# Patient Record
Sex: FEMALE | Employment: OTHER | URBAN - METROPOLITAN AREA
[De-identification: names, ages, dates, MRNs, and addresses within clinical notes are randomized per-mention and may not be internally consistent; named-entity substitution may affect disease eponyms.]

---

## 2020-04-29 ENCOUNTER — HOSPITAL ENCOUNTER (OUTPATIENT)
Dept: PHYSICAL THERAPY | Age: 80
Discharge: HOME | End: 2020-04-29
Payer: MEDICARE

## 2020-04-29 PROCEDURE — 97140 MANUAL THERAPY 1/> REGIONS: CPT

## 2020-04-29 PROCEDURE — 97530 THERAPEUTIC ACTIVITIES: CPT

## 2020-04-29 PROCEDURE — 97162 PT EVAL MOD COMPLEX 30 MIN: CPT

## 2020-04-29 NOTE — PROGRESS NOTES
PT DAILY TREATMENT NOTE 10-18    Patient Name: Jin Shaw  Date:2020  : 1940  [x]  Patient  Verified  Payor: [place] MEDICARE / Plan: [place] MEDICARE PART A & B / Product Type: Medicare /    In time:2:05  Out time:2:50  Total Treatment Time (min): 45  Visit #: 1 of 10      Medicare/BCBS Only   Total Timed Codes (min):  25 1:1 Treatment Time:  40       Treatment Area: Right shoulder pain [M25.511]     SUBJECTIVE  Pain Level (0-10 scale): 7/10  Any medication changes, allergies to medications, adverse drug reactions, diagnosis change, or new procedure performed?: [x] No    [] Yes (see summary sheet for update)  Subjective functional status/changes:   [x] See paper initial evaluation form in patient chart.       OBJECTIVE    20 min [x]Eval                  []Re-Eval     10 min Therapeutic Activity:  [] See flow sheet : prognosis; surgical rehab protocol, HEP given and reviewed with Pt   Rationale: increase ROM and increase strength to improve the patients ability to improve right shoulder mobility with proper GHJ mechanics and increase ability to perform functional tasks independently    15 min Manual Therapy:  S/L gentle scapular mobs, STM to right rhomboids; Supine gentle STM to right deltoid, bicep; Gr I oscillations, manual stretching into Flex, Scaption, ER at neutral   Rationale: decrease pain, increase ROM, increase tissue extensibility and decrease trigger points to improve right shoulder mobility with less pain to increase ease of reaching    With   [] TE   [x] TA   [] neuro   [] other: Patient Education: [x] Review HEP    [] Progressed/Changed HEP based on:   [] positioning   [] body mechanics   [] transfers   [] heat/ice application    [] other:      Other Objective/Functional Measures: FOTO 45 pts     Pain Level (0-10 scale) post treatment: 7/10    ASSESSMENT/Changes in Function:     Patient will continue to benefit from skilled PT services to address functional mobility deficits, address ROM deficits, address strength deficits, analyze and address soft tissue restrictions, analyze and cue movement patterns, analyze and modify body mechanics/ergonomics, assess and modify postural abnormalities and instruct in home and community integration to attain remaining goals.      [x]  See Plan of Care  []  See progress note/recertification  []  See Discharge Summary         PLAN  []  Upgrade activities as tolerated     [x]  Continue plan of care  []  Update interventions per flow sheet       []  Discharge due to:_  []  Other:_      Bessie Anderson, PT 4/29/2020  5:36 PM

## 2020-04-29 NOTE — PROGRESS NOTES
[facility], [address] [place] [address]  [phone] fax  Plan of Care/ Statement of Necessity for Physical Therapy Services    Patient name: Kathe Mendoza Start of Care: 2020   Referral source: Conner Riggins MD : 1940    Medical Diagnosis: Right shoulder pain [M25.511]  Payor: VA MEDICARE / Plan: VA MEDICARE PART A & B / Product Type: Medicare /  Onset Date:3/12/20 (surgery)    Treatment Diagnosis: Right Shoulder Pain   Prior Hospitalization: see medical history Provider#: [id number]   Medications: Verified on Patient summary List    Comorbidities: Arthritis; DM; Osteoporosis; Thyroid problems; hx left shoulder surgeries x 2; hx right shoulder surgeries x 3   Prior Level of Function: Lives in [place] with son's family; amb without A.D; modified independent with ADLs      The Plan of Care and following information is based on the information from the initial evaluation. Assessment/ key information: Pt is a 79 y.o. female who presents with c/o right shoulder pain, limited mobility, and strength, affecting ability to raise arm overhead, lift or carry items, perform bathing and grooming independently, and assist with moderate household chores. Pt with long term hx of right shoulder pain secondary to arthritis that did not improve with conservative management and previous surgeries, resulting in need for right reverse TSR on 3/12/20. Pt recently moved to [address] [place] [address] after death of spouse and only had two weeks of PT but continues compliance with PROM based HEP. Upon exam, Pt exhibited right shoulder AROM of Flex 71 deg, ABD 60 deg, Ext 32 deg, ER at neutral - 5 deg; 2/5 strength due to limited ROM, and palpable tightness in right arm and along medial scapular border. Pt would benefit from skilled PT to address above deficits to improve Pt's functional independence with less pain.     Evaluation Complexity History MEDIUM  Complexity : 1-2 comorbidities / personal factors will impact the outcome/ POC ; Examination MEDIUM Complexity : 3 Standardized tests and measures addressing body structure, function, activity limitation and / or participation in recreation  ;Presentation MEDIUM Complexity : Evolving with changing characteristics  ; Clinical Decision Making MEDIUM Complexity : FOTO score of 26-74  Overall Complexity Rating: MEDIUM  Problem List: pain affecting function, decrease ROM, decrease strength, edema affecting function, decrease ADL/ functional abilitiies, decrease activity tolerance, decrease flexibility/ joint mobility and decrease transfer abilities   Treatment Plan may include any combination of the following: Therapeutic exercise, Therapeutic activities, Neuromuscular re-education, Physical agent/modality, Manual therapy, Patient education, Self Care training and Functional mobility training  Patient / Family readiness to learn indicated by: asking questions, trying to perform skills and interest  Persons(s) to be included in education: patient (P)  Barriers to Learning/Limitations: None  Patient Goal (s): Movement, less pain, recovery.   Patient Self Reported Health Status: good  Rehabilitation Potential: good    Short Term Goals: To be accomplished in 1 weeks:  Goal: Pt to be compliant with initial HEP to improve right shoulder mobility for ease of positioning and ADL performance. Status at last note/certification: Established and reviewed with Pt  Long Term Goals: To be accomplished in 5 weeks:  Goal: Pt to increase right shoulder AROM by at least 20 deg all planes to increase ease of bathing and dressing independently. Status at last note/certification: Flex 71 deg, ABD 60 deg, Ext 32 deg, ER at neutral - 5 deg  Goal: Pt to increase right shoulder strength to 3+/5 to increase ease of lifting light grocery bags.   Status at last note/certification: 2/5 grossly due to limited ROM  Goal: Pt to report < 5/10 pain at worst to be able to sleep comfortably at night in bed for better rest.  Status at last note/certification: 37/32 pain at worst; sleeps on couch or bed throughout each night  Goal: Pt to report FOTO score of 58 pts to show improved function and quality of life. Status at last note/certification: FOTO 45 pts     Frequency / Duration: Patient to be seen 2 times per week for 5 weeks. Patient/ Caregiver education and instruction: Diagnosis, prognosis, exercises   [x]  Plan of care has been reviewed with PTA    Certification Period: 4/29/20 - 5/28/20  Abimbola Anderson, PT 4/29/2020 5:40 PM  _____________________________________________________________________  I certify that the above Therapy Services are being furnished while the patient is under my care. I agree with the treatment plan and certify that this therapy is necessary.     Physician's Signature:____________Date:_________TIME:________    ** Signature, Date and Time must be completed for valid certification **    Please sign and return to [facility] [address]  [phone] fax

## 2020-05-04 ENCOUNTER — HOSPITAL ENCOUNTER (OUTPATIENT)
Dept: PHYSICAL THERAPY | Age: 80
Discharge: HOME | End: 2020-05-04
Payer: MEDICARE

## 2020-05-04 PROCEDURE — 97110 THERAPEUTIC EXERCISES: CPT

## 2020-05-04 PROCEDURE — 97140 MANUAL THERAPY 1/> REGIONS: CPT

## 2020-05-04 NOTE — PROGRESS NOTES
PT DAILY TREATMENT NOTE 10-18    Patient Name: Daniel Froeman  Date:2020  : 1940  [x]  Patient  Verified  Payor: Brian Risk / Plan: VA MEDICARE PART A & B / Product Type: Medicare /    In time:11:20  Out time:12:10  Total Treatment Time (min): 50  Visit #: 2 of 10    Medicare/BCBS Only   Total Timed Codes (min):  40 1:1 Treatment Time:  40       Treatment Area: Right shoulder pain [M25.511]    SUBJECTIVE  Pain Level (0-10 scale): 8/10  Any medication changes, allergies to medications, adverse drug reactions, diagnosis change, or new procedure performed?: [x] No    [] Yes (see summary sheet for update)  Subjective functional status/changes:   [] No changes reported  \"The arm is sore. I think it might be the weather. I just feel an ache from the shoulder all the way down to the elbow. \"    OBJECTIVE    Modality rationale: decrease inflammation and decrease pain to improve the patients ability to reduce soreness after therapy   Min Type Additional Details    [] Estim:  []Unatt       []IFC  []Premod                        []Other:  []w/ice   []w/heat  Position:  Location:    [] Estim: []Att    []TENS instruct  []NMES                    []Other:  []w/US   []w/ice   []w/heat  Position:  Location:    []  Traction: [] Cervical       []Lumbar                       [] Prone          []Supine                       []Intermittent   []Continuous Lbs:  [] before manual  [] after manual    []  Ultrasound: []Continuous   [] Pulsed                           []1MHz   []3MHz W/cm2:  Location:    []  Iontophoresis with dexamethasone         Location: [] Take home patch   [] In clinic   10 [x]  Ice     []  heat  []  Ice massage  []  Laser   []  Anodyne Position: seated  Location: right shoulder    []  Laser with stim  []  Other:  Position:  Location:    []  Vasopneumatic Device Pressure:       [] lo [] med [] hi   Temperature: [] lo [] med [] hi   [x] Skin assessment post-treatment:  [x]intact []redness- no adverse reaction    []redness  adverse reaction:     23 min Therapeutic Exercise:  [] See flow sheet :   Rationale: increase ROM and increase strength to improve the patients ability to improve right shoulder mobility, strength for ease with bathing, dressing    17 min Manual Therapy:  S/L gentle scapular mobs, STM to rhomboids; Supine STM to right deltoid, biceps, gentle manual stretching Flex/Scaption; ant/post GHJ glides at neutral, Gr II   Rationale: decrease pain, increase ROM, increase tissue extensibility and decrease trigger points to improve right shoulder, scapular mobility for ease of reaching          With   [] TE   [] TA   [] neuro   [] other: Patient Education: [x] Review HEP    [] Progressed/Changed HEP based on:   [] positioning   [] body mechanics   [] transfers   [] heat/ice application    [] other:      Other Objective/Functional Measures: Initiated Rx program per flow sheet. Pt given verbal and demonstrative cueing for proper technique of all exercises. Emphasized staying within tolerable ranges to avoid increases in overall pain levels. Pain Level (0-10 scale) post treatment: 0/10    ASSESSMENT/Changes in Function: Pt reported reduction in right scapular and shoulder pain with manual interventions at beginning of session but still has \"catches\" in right arm when moving. Pt most limited in ER, not tolerating at all, secondary to pain. Focused on gentle ROM within Pt's tolerance as Pt has not had PT in over 3 weeks and exhibits increased soft tissue restrictions and pain. Patient will continue to benefit from skilled PT services to address functional mobility deficits, address ROM deficits, address strength deficits, analyze and address soft tissue restrictions, analyze and cue movement patterns, analyze and modify body mechanics/ergonomics, assess and modify postural abnormalities and instruct in home and community integration to attain remaining goals.      []  See Plan of Care  []  See progress note/recertification  []  See Discharge Summary         Progress towards goals / Updated goals:  Short Term Goals: To be accomplished in 1 weeks:  Goal: Pt to be compliant with initial HEP to improve right shoulder mobility for ease of positioning and ADL performance. Status at last note/certification: Established and reviewed with Pt  Current status: met - Pt reports compliance  Long Term Goals: To be accomplished in 5 weeks:  Goal: Pt to increase right shoulder AROM by at least 20 deg all planes to increase ease of bathing and dressing independently. Status at last note/certification: Flex 71 deg, ABD 60 deg, Ext 32 deg, ER at neutral - 5 deg  Goal: Pt to increase right shoulder strength to 3+/5 to increase ease of lifting light grocery bags. Status at last note/certification: 2/5 grossly due to limited ROM  Goal: Pt to report < 5/10 pain at worst to be able to sleep comfortably at night in bed for better rest.  Status at last note/certification: 88/16 pain at worst; sleeps on couch or bed throughout each night  Goal: Pt to report FOTO score of 58 pts to show improved function and quality of life.   Status at last note/certification: FOTO 45 pts     PLAN  []  Upgrade activities as tolerated     [x]  Continue plan of care  []  Update interventions per flow sheet       []  Discharge due to:_  []  Other:_      Cathi Anderson, PT 5/4/2020  1:08 PM    Future Appointments   Date Time Provider Des Hernandez   5/7/2020 12:00 PM Gina Ceballos, PT [facility]   5/11/2020 11:15 AM Cathi Anderson, PT [facility]   5/13/2020 11:15 AM Cathi Anderson, PT [facility]   5/18/2020 11:15 AM Cathi Anderson, PT [facility]   5/20/2020 11:15 AM Cathi Anderson, PT [facility]   5/26/2020 12:00 PM Cathi Anderson, PT [facility]   5/28/2020 11:15 AM Cathi Anderson, PT [facility]

## 2020-05-07 ENCOUNTER — HOSPITAL ENCOUNTER (OUTPATIENT)
Dept: PHYSICAL THERAPY | Age: 80
Discharge: HOME | End: 2020-05-07
Payer: MEDICARE

## 2020-05-07 PROCEDURE — 97110 THERAPEUTIC EXERCISES: CPT | Performed by: PHYSICAL THERAPIST

## 2020-05-07 PROCEDURE — 97140 MANUAL THERAPY 1/> REGIONS: CPT | Performed by: PHYSICAL THERAPIST

## 2020-05-07 PROCEDURE — 97014 ELECTRIC STIMULATION THERAPY: CPT | Performed by: PHYSICAL THERAPIST

## 2020-05-07 NOTE — PROGRESS NOTES
PT DAILY TREATMENT NOTE 10-18    Patient Name: Cinthia Giraldo  Date:2020  : 1940  [x]  Patient  Verified  Payor: VA MEDICARE / Plan: VA MEDICARE PART A & B / Product Type: Medicare /    In time:1202  Out time:1259  Total Treatment Time (min): 62  Visit #: 3 of 10    Medicare/BCBS Only   Total Timed Codes (min):  47 1:1 Treatment Time:  52       Treatment Area: Right shoulder pain [M25.511]    SUBJECTIVE  Pain Level (0-10 scale): 7/10  Any medication changes, allergies to medications, adverse drug reactions, diagnosis change, or new procedure performed?: [x] No    [] Yes (see summary sheet for update)  Subjective functional status/changes:   [] No changes reported  Pt reports she is having some soreness today and it feels like someone holding tight to her shoulder    OBJECTIVE    Modality rationale: decrease edema, decrease inflammation and decrease pain to improve the patients ability to decrease soreness    Min Type Additional Details   10 [x] Estim:  []Unatt       [x]IFC  []Premod                        []Other:  [x]w/ice   []w/heat  Position: seated  Location: right shoulder    [] Estim: []Att    []TENS instruct  []NMES                    []Other:  []w/US   []w/ice   []w/heat  Position:  Location:    []  Traction: [] Cervical       []Lumbar                       [] Prone          []Supine                       []Intermittent   []Continuous Lbs:  [] before manual  [] after manual    []  Ultrasound: []Continuous   [] Pulsed                           []1MHz   []3MHz W/cm2:  Location:    []  Iontophoresis with dexamethasone         Location: [] Take home patch   [] In clinic    [x]  Ice     []  heat  []  Ice massage  []  Laser   []  Anodyne Position:   Location:    []  Laser with stim  []  Other:  Position:  Location:    []  Vasopneumatic Device Pressure:       [] lo [] med [] hi   Temperature: [] lo [] med [] hi   [x] Skin assessment post-treatment:  [x]intact []redness- no adverse reaction []redness  adverse reaction:     24 min Therapeutic Exercise:  [x] See flow sheet :   Rationale: increase ROM and increase strength to improve the patients ability to improve pain and activity tolerance     23 min Manual Therapy:  *S/L gentle scapular mobs, STM to rhomboids; Supine STM to right deltoid, biceps, gentle manual stretching Flex/Scaption; ant/post GHJ glides at neutral, Gr II   Rationale: decrease pain, increase ROM, increase tissue extensibility, decrease trigger points and increase postural awareness to decrease pain and improve activity toelrance          With   [] TE   [] TA   [] neuro   [] other: Patient Education: [x] Review HEP    [] Progressed/Changed HEP based on:   [] positioning   [] body mechanics   [] transfers   [] heat/ice application    [] other:      Other Objective/Functional Measures:      Pain Level (0-10 scale) post treatment: 0/10    ASSESSMENT/Changes in Function: Pt with increased guarding during manual therapy and reports of pain when she performed submax isometrics. Increased TrPs throughout the parascapular mm loosened w/ manual and the scapular boarder was appreciated. Pt reported improved mobility following session. Initiated IFC to ease pain as well. Patient will continue to benefit from skilled PT services to modify and progress therapeutic interventions, address functional mobility deficits, address ROM deficits, address strength deficits, analyze and address soft tissue restrictions, analyze and cue movement patterns, analyze and modify body mechanics/ergonomics, assess and modify postural abnormalities and instruct in home and community integration to attain remaining goals.      []  See Plan of Care  []  See progress note/recertification  []  See Discharge Summary         Progress towards goals / Updated goals:  Short Term Goals: To be accomplished in 1 weeks:  Goal: Pt to be compliant with initial HEP to improve right shoulder mobility for ease of positioning and ADL performance. Status at last note/certification: Established and reviewed with Pt  Current status: met - Pt reports compliance 5/4/2020  Long Term Goals: To be accomplished in 5 weeks:  Goal: Pt to increase right shoulder AROM by at least 20 deg all planes to increase ease of bathing and dressing independently. Status at last note/certification: Flex 71 deg, ABD 60 deg, Ext 32 deg, ER at neutral - 5 deg  Goal: Pt to increase right shoulder strength to 3+/5 to increase ease of lifting light grocery bags. Status at last note/certification: 2/5 grossly due to limited ROM  Goal: Pt to report < 5/10 pain at worst to be able to sleep comfortably at night in bed for better rest.  Status at last note/certification: 10/10 pain at worst; sleeps on couch or bed throughout each night  Goal: Pt to report FOTO score of 58 pts to show improved function and quality of life.   Status at last note/certification: ASGV 89 NPS     PLAN  [x]  Upgrade activities as tolerated     []  Continue plan of care  []  Update interventions per flow sheet       []  Discharge due to:_  []  Other:_      Carlos Manuel Garcia, PT 5/7/2020  12:16 PM    Future Appointments   Date Time Provider Des Hernandez   5/11/2020 11:15 AM Juwan Anderson, PT [facility]   5/13/2020 11:15 AM Juwan Anderson, PT [facility]   5/18/2020 11:15 AM Juwan Anderson, PT [facility]   5/20/2020 11:15 AM Juwan Anderson, PT [facility]   5/26/2020 12:00 PM Juwan Anderson, PT [facility]   5/28/2020 11:15 AM Juwan Anderson, PT [facility]

## 2020-05-11 ENCOUNTER — HOSPITAL ENCOUNTER (OUTPATIENT)
Dept: PHYSICAL THERAPY | Age: 80
Discharge: HOME | End: 2020-05-11
Payer: MEDICARE

## 2020-05-11 PROCEDURE — 97014 ELECTRIC STIMULATION THERAPY: CPT

## 2020-05-11 PROCEDURE — 97110 THERAPEUTIC EXERCISES: CPT

## 2020-05-11 PROCEDURE — 97140 MANUAL THERAPY 1/> REGIONS: CPT

## 2020-05-11 NOTE — PROGRESS NOTES
PT DAILY TREATMENT NOTE 10-18    Patient Name: Dat Ugalde  Date:2020  : 1940  [x]  Patient  Verified  Payor: Idalia Michelle / Plan: [place] / Product Type: Medicare /    In time:11:20  Out time:12:15  Total Treatment Time (min): 55  Visit #: 4 of 10    Medicare/BCBS Only   Total Timed Codes (min):  45 1:1 Treatment Time:  45       Treatment Area: Right shoulder pain [M25.511]    SUBJECTIVE  Pain Level (0-10 scale): 7/10  Any medication changes, allergies to medications, adverse drug reactions, diagnosis change, or new procedure performed?: [x] No    [] Yes (see summary sheet for update)  Subjective functional status/changes:   [] No changes reported  \"I was doing well until this morning. I woke up at 5 am with achiness in the arm. It feels like someone is grabbing and holding on to it real tight. \"    OBJECTIVE    Modality rationale: decrease edema, decrease inflammation and decrease pain to improve the patients ability to reduce soreness after PT interventions   Min Type Additional Details   10 [x] Estim:  [x]Unatt       [x]IFC  []Premod                        []Other:  [x]w/ice   []w/heat  Position: seated  Location: right shoulder    [] Estim: []Att    []TENS instruct  []NMES                    []Other:  []w/US   []w/ice   []w/heat  Position:  Location:    []  Traction: [] Cervical       []Lumbar                       [] Prone          []Supine                       []Intermittent   []Continuous Lbs:  [] before manual  [] after manual    []  Ultrasound: []Continuous   [] Pulsed                           []1MHz   []3MHz W/cm2:  Location:    []  Iontophoresis with dexamethasone         Location: [] Take home patch   [] In clinic    [x]  Ice     []  heat  []  Ice massage  []  Laser   []  Anodyne Position:   Location:    []  Laser with stim  []  Other:  Position:  Location:    []  Vasopneumatic Device Pressure:       [] lo [] med [] hi   Temperature: [] lo [] med [] hi   [x] Skin assessment post-treatment:  [x]intact []redness- no adverse reaction    []redness  adverse reaction:     20 min Therapeutic Exercise:  [x] See flow sheet :   Rationale: increase ROM and increase strength to improve the patients ability to improve right shoulder function and ease of ADLs     25 min Manual Therapy: Semi-reclined STM, TrP release to right deltoid; Gr II Inf GHJ glides; gentle manual stretching into Flex/Scaption; S/L STJ mobs, Gr II-III; STM, TrP release to right rhomboids, infraspinatus mm   Rationale: decrease pain, increase ROM, increase tissue extensibility and decrease trigger points to decrease pain, positional tolerance, and right shoulder mobility          With   [] TE   [] TA   [] neuro   [] other: Patient Education: [x] Review HEP    [] Progressed/Changed HEP based on:   [] positioning   [] body mechanics   [] transfers   [] heat/ice application    [] other:      Other Objective/Functional Measures: Performed exercises per flow sheet. Verbal cues given for maintaining upright posture and scapular setting during exercises as Pt tends to slouch. Pain Level (0-10 scale) post treatment: 0/10    ASSESSMENT/Changes in Function:  Initiated session with manual therapy to improve right shoulder, scapular mobility and reduce TrPs along right deltoid, parascapular mm. Pt noted only minor \"catches\" or aches into arm with exercises today. Pt did note fatigue lowering right UE with descent on finger ladder. Pt able to report relief of pain with modalities at end of session.     Patient will continue to benefit from skilled PT services to modify and progress therapeutic interventions, address functional mobility deficits, address ROM deficits, address strength deficits, analyze and address soft tissue restrictions, analyze and cue movement patterns, analyze and modify body mechanics/ergonomics, assess and modify postural abnormalities and instruct in home and community integration to attain remaining goals. []  See Plan of Care  []  See progress note/recertification  []  See Discharge Summary         Progress towards goals / Updated goals:  Short Term Goals: To be accomplished in 1 weeks:  Goal: Pt to be compliant with initial HEP to improve right shoulder mobility for ease of positioning and ADL performance. Status at last note/certification: Established and reviewed with Pt  Current status: met - Pt reports compliance 5/4/2020  Long Term Goals: To be accomplished in 5 weeks:  Goal: Pt to increase right shoulder AROM by at least 20 deg all planes to increase ease of bathing and dressing independently. Status at last note/certification: Flex 71 deg, ABD 60 deg, Ext 32 deg, ER at neutral - 5 deg  Current status:  Goal: Pt to increase right shoulder strength to 3+/5 to increase ease of lifting light grocery bags. Status at last note/certification: 2/5 grossly due to limited ROM  Current status:  Goal: Pt to report < 5/10 pain at worst to be able to sleep comfortably at night in bed for better rest.  Status at last note/certification: 10/10 pain at worst; sleeps on couch or bed throughout each night  Current status:  Goal: Pt to report FOTO score of 58 pts to show improved function and quality of life.   Status at last note/certification: XBTY 65 VKZ   Current status:    PLAN  [x]  Upgrade activities as tolerated     []  Continue plan of care  []  Update interventions per flow sheet       []  Discharge due to:_  []  Other:_      Darnell Anderson, PT 5/11/2020  12:16 PM    Future Appointments   Date Time Provider Des Hernandez   5/13/2020 11:15 AM Darnell Anderson, PT [facility]   5/18/2020 11:15 AM Darnell Anderson, PT [facility]   5/20/2020 11:15 AM Darnell Anderson, PT [facility]   5/26/2020 12:00 PM Darnell Anderson, PT [facility]   5/28/2020 11:15 AM Darnell Anderson, PT [facility]

## 2020-05-13 ENCOUNTER — HOSPITAL ENCOUNTER (OUTPATIENT)
Dept: PHYSICAL THERAPY | Age: 80
Discharge: HOME | End: 2020-05-13
Payer: MEDICARE

## 2020-05-13 PROCEDURE — 97140 MANUAL THERAPY 1/> REGIONS: CPT

## 2020-05-13 PROCEDURE — 97014 ELECTRIC STIMULATION THERAPY: CPT

## 2020-05-13 PROCEDURE — 97110 THERAPEUTIC EXERCISES: CPT

## 2020-05-13 NOTE — PROGRESS NOTES
PT DAILY TREATMENT NOTE 10-18    Patient Name: Talon Powers  Date:2020  : 1940  [x]  Patient  Verified  Payor: Linden Covington / Plan: [place] MED[place] [facility] [place] / Product Type: Medicare /    In time:11:20  Out time:12:15  Total Treatment Time (min): 55  Visit #: 5 of 10    Medicare/BCBS Only   Total Timed Codes (min):  45 1:1 Treatment Time:  45       Treatment Area: Right shoulder pain [M25.511]    SUBJECTIVE  Pain Level (0-10 scale): 6/10  Any medication changes, allergies to medications, adverse drug reactions, diagnosis change, or new procedure performed?: [x] No    [] Yes (see summary sheet for update)  Subjective functional status/changes:   [] No changes reported  \"I am down one point. I still have pain but I haven't been having as much pain and not as sharp. \"    OBJECTIVE    Modality rationale: decrease edema, decrease inflammation and decrease pain to improve the patients ability to reduce soreness after PT interventions   Min Type Additional Details   10 [x] Estim:  [x]Unatt       [x]IFC  []Premod                        []Other:  [x]w/ice   []w/heat  Position: seated  Location: right shoulder    [] Estim: []Att    []TENS instruct  []NMES                    []Other:  []w/US   []w/ice   []w/heat  Position:  Location:    []  Traction: [] Cervical       []Lumbar                       [] Prone          []Supine                       []Intermittent   []Continuous Lbs:  [] before manual  [] after manual    []  Ultrasound: []Continuous   [] Pulsed                           []1MHz   []3MHz W/cm2:  Location:    []  Iontophoresis with dexamethasone         Location: [] Take home patch   [] In clinic    [x]  Ice     []  heat  []  Ice massage  []  Laser   []  Anodyne Position:   Location:    []  Laser with stim  []  Other:  Position:  Location:    []  Vasopneumatic Device Pressure:       [] lo [] med [] hi   Temperature: [] lo [] med [] hi   [x] Skin assessment post-treatment:  [x]intact []redness- no adverse reaction    []redness  adverse reaction:     20 min Therapeutic Exercise:  [x] See flow sheet :   Rationale: increase ROM and increase strength to improve the patients ability to improve right shoulder function and ease of ADLs     25 min Manual Therapy: S/L STJ mobs, Gr II-III; STM, TrP release to right rhomboids, infraspinatus mm; Semi-reclined STM, TrP release to right deltoid; Gr II Inf GHJ glides; gentle manual stretching into Flex/Scaption   Rationale: decrease pain, increase ROM, increase tissue extensibility and decrease trigger points to decrease pain, improve positional tolerance, and increase right shoulder, scapular mobility          With   [] TE   [] TA   [] neuro   [] other: Patient Education: [x] Review HEP    [] Progressed/Changed HEP based on:   [] positioning   [] body mechanics   [] transfers   [] heat/ice application    [] other:      Other Objective/Functional Measures: Added table slides in Flex, Scaption and reclined wand chest press to begin gentle AAROM of right UE. Pain Level (0-10 scale) post treatment: 0/10    ASSESSMENT/Changes in Function:  Pt able to perform wand chest press without increased difficulty but did note some pain by end of repetitions into right deltoid. Pt noted quick, sharp pains with table slides. Pt reports compliance with HEP and improved sleep at night, now able to lie supine without increased discomfort along scapula or into right arm. Will continue to progress gently to improve right shoulder function. Patient will continue to benefit from skilled PT services to modify and progress therapeutic interventions, address functional mobility deficits, address ROM deficits, address strength deficits, analyze and address soft tissue restrictions, analyze and cue movement patterns, analyze and modify body mechanics/ergonomics, assess and modify postural abnormalities and instruct in home and community integration to attain remaining goals.      []  See Plan of Care  []  See progress note/recertification  []  See Discharge Summary         Progress towards goals / Updated goals:  Short Term Goals: To be accomplished in 1 weeks:  Goal: Pt to be compliant with initial HEP to improve right shoulder mobility for ease of positioning and ADL performance. Status at last note/certification: Established and reviewed with Pt  Current status: met - Pt reports compliance 5/4/2020  Long Term Goals: To be accomplished in 5 weeks:  Goal: Pt to increase right shoulder AROM by at least 20 deg all planes to increase ease of bathing and dressing independently. Status at last note/certification: Flex 71 deg, ABD 60 deg, Ext 32 deg, ER at neutral - 5 deg  Current status: reassess next visit  Goal: Pt to increase right shoulder strength to 3+/5 to increase ease of lifting light grocery bags. Status at last note/certification: 2/5 grossly due to limited ROM  Current status: reassess at MD note  Goal: Pt to report < 5/10 pain at worst to be able to sleep comfortably at night in bed for better rest.  Status at last note/certification: 10/10 pain at worst; sleeps on couch or bed throughout each night  Current status: progressing - 7-8/10 pain at worst; able to sleep supine more with less pain  Goal: Pt to report FOTO score of 58 pts to show improved function and quality of life.   Status at last note/certification: QFXF 96 NE   Current status: reassess at MD note    PLAN  [x]  Upgrade activities as tolerated     []  Continue plan of care  []  Update interventions per flow sheet       []  Discharge due to:_  []  Other:_      Armida Anderson PT 5/13/2020  12:16 PM    Future Appointments   Date Time Provider Des Hernandez   5/18/2020 11:15 AM Armida Anderson, PT [facility]   5/20/2020 11:15 AM Armida Anderson, PT [facility]   5/26/2020 12:00 PM Armida Anderson PT [facility]   5/28/2020 11:15 AM Armida Anderson, PT [facility]

## 2020-05-18 ENCOUNTER — HOSPITAL ENCOUNTER (OUTPATIENT)
Dept: PHYSICAL THERAPY | Age: 80
Discharge: HOME | End: 2020-05-18
Payer: MEDICARE

## 2020-05-18 PROCEDURE — 97110 THERAPEUTIC EXERCISES: CPT

## 2020-05-18 PROCEDURE — 97014 ELECTRIC STIMULATION THERAPY: CPT

## 2020-05-18 PROCEDURE — 97140 MANUAL THERAPY 1/> REGIONS: CPT

## 2020-05-18 NOTE — PROGRESS NOTES
PT DAILY TREATMENT NOTE 10-18    Patient Name: Natalee Snider  Date:2020  : 1940  [x]  Patient  Verified  Payor: Annika Rivera / Plan: [place] MEDICARE PART [place] [facility] [place] / Product Type: Medicare /    In time:11:05  Out time:12:10  Total Treatment Time (min): 65   Visit #: 6 of 10    Medicare/BCBS Only   Total Timed Codes (min):  55 1:1 Treatment Time:  55       Treatment Area: Right shoulder pain [M25.511]    SUBJECTIVE  Pain Level (0-10 scale): 7/10  Any medication changes, allergies to medications, adverse drug reactions, diagnosis change, or new procedure performed?: [x] No    [] Yes (see summary sheet for update)  Subjective functional status/changes:   [] No changes reported  \"I started having pain Saturday night and had to take a whole pain pill. The rainy weather hasn't helped the pain this morning. I am sleeping a little better but I still have some sharp pains. \"    OBJECTIVE    Modality rationale: decrease edema, decrease inflammation and decrease pain to improve the patients ability to reduce soreness after PT interventions   Min Type Additional Details   10 [x] Estim:  [x]Unatt       [x]IFC  []Premod                        []Other:  [x]w/ice   []w/heat  Position: seated  Location: right shoulder    [] Estim: []Att    []TENS instruct  []NMES                    []Other:  []w/US   []w/ice   []w/heat  Position:  Location:    []  Traction: [] Cervical       []Lumbar                       [] Prone          []Supine                       []Intermittent   []Continuous Lbs:  [] before manual  [] after manual    []  Ultrasound: []Continuous   [] Pulsed                           []1MHz   []3MHz W/cm2:  Location:    []  Iontophoresis with dexamethasone         Location: [] Take home patch   [] In clinic    []  Ice     []  heat  []  Ice massage  []  Laser   []  Anodyne Position:   Location:    []  Laser with stim  []  Other:  Position:  Location:    []  Vasopneumatic Device Pressure:       [] lo [] med [] hi Temperature: [] lo [] med [] hi   [x] Skin assessment post-treatment:  [x]intact []redness- no adverse reaction    []redness  adverse reaction:     25 min Therapeutic Exercise:  [x] See flow sheet :   Rationale: increase ROM and increase strength to improve the patients ability to improve right shoulder function and ease of ADLs     30 min Manual Therapy: Seated TrP releases to levator scapulae, infraspinatus, teres minor, pecotral mm; S/L STJ mobs, Gr II-III; STM, TrP release to right rhomboids, Supine Gr II Inf GHJ glides; gentle manual stretching into Flex/Scaption, ER in scapular plane, void of pain   Rationale: decrease pain, increase ROM, increase tissue extensibility and decrease trigger points to decrease pain, improve positional tolerance, and increase right shoulder, scapular mobility          With   [] TE   [] TA   [] neuro   [] other: Patient Education: [x] Review HEP    [] Progressed/Changed HEP based on:   [] positioning   [] body mechanics   [] transfers   [] heat/ice application    [] other:      Other Objective/Functional Measures: Focused on manual therapy techniques to improve tightness and pain in right shoulder. Pain Level (0-10 scale) post treatment: 4/10 (tight) in right proximal deltoid    ASSESSMENT/Changes in Function:  Pt exhibits mild increases in right shoulder AROM today with less pain in right arm. Pt reports preoperatively she was unable to raise right UE much at all and thus current mobility is an improvement. Pt reports improved sleep at night but still working to get comfortable at night. Pt exhibits increased soft tissue restrictions in periscapular and RTC musculature that decreases with manual techniques but impedes mobility. Pt also complains of pain down right deltoid with intermittent paresthesias.     Patient will continue to benefit from skilled PT services to modify and progress therapeutic interventions, address functional mobility deficits, address ROM deficits, address strength deficits, analyze and address soft tissue restrictions, analyze and cue movement patterns, analyze and modify body mechanics/ergonomics, assess and modify postural abnormalities and instruct in home and community integration to attain remaining goals. []  See Plan of Care  []  See progress note/recertification  []  See Discharge Summary         Progress towards goals / Updated goals:  Short Term Goals: To be accomplished in 1 weeks:  Goal: Pt to be compliant with initial HEP to improve right shoulder mobility for ease of positioning and ADL performance. Status at last note/certification: Established and reviewed with Pt  Current status: met - Pt reports compliance   Long Term Goals: To be accomplished in 5 weeks:  Goal: Pt to increase right shoulder AROM by at least 20 deg all planes to increase ease of bathing and dressing independently. Status at last note/certification: Flex 71 deg, ABD 60 deg, Ext 32 deg, ER at neutral - 5 deg  Current status: progressing - Flex 76 deg, ABD 70 deg, ER at 45 deg - 10 deg  Goal: Pt to increase right shoulder strength to 3+/5 to increase ease of lifting light grocery bags. Status at last note/certification: 2/5 grossly due to limited ROM  Current status: reassess at MD note  Goal: Pt to report < 5/10 pain at worst to be able to sleep comfortably at night in bed for better rest.  Status at last note/certification: 10/10 pain at worst; sleeps on couch or bed throughout each night  Current status: progressing - 7-8/10 pain at worst; able to sleep supine more with less pain  Goal: Pt to report FOTO score of 58 pts to show improved function and quality of life.   Status at last note/certification: GMYJ 15 OUI   Current status: reassess at MD note    PLAN  [x]  Upgrade activities as tolerated     []  Continue plan of care  []  Update interventions per flow sheet       []  Discharge due to:_  []  Other:_      Bessie Anderson, PT 5/18/2020  12:16 PM    Future Appointments   Date Time Provider Des Hernandez   5/18/2020 11:15 AM Dafne Anderson, PT [facility]   5/20/2020 11:15 AM Dafne Anderson, PT [facility]   5/26/2020 12:00 PM Dafne Anderson, PT [facility]   5/28/2020 11:15 AM Dafne Anderson, PT [facility]

## 2020-05-20 ENCOUNTER — HOSPITAL ENCOUNTER (OUTPATIENT)
Dept: PHYSICAL THERAPY | Age: 80
Discharge: HOME | End: 2020-05-20
Payer: MEDICARE

## 2020-05-20 PROCEDURE — 97014 ELECTRIC STIMULATION THERAPY: CPT

## 2020-05-20 PROCEDURE — 97140 MANUAL THERAPY 1/> REGIONS: CPT

## 2020-05-20 PROCEDURE — 97110 THERAPEUTIC EXERCISES: CPT

## 2020-05-20 NOTE — PROGRESS NOTES
PT DAILY TREATMENT NOTE 10-18    Patient Name: Dedrick Mirza  Date:2020  : 1940  [x]  Patient  Verified  Payor: Kavon Edge / Plan: [place] MEDICARE PART A & B / Product Type: Medicare /    In time:11:25  Out time:12:15  Total Treatment Time (min): 55   Visit #: 7 of 10    Medicare/BCBS Only   Total Timed Codes (min):  45 1:1 Treatment Time:  45       Treatment Area: Right shoulder pain [M25.511]    SUBJECTIVE  Pain Level (0-10 scale): 7/10  Any medication changes, allergies to medications, adverse drug reactions, diagnosis change, or new procedure performed?: [x] No    [] Yes (see summary sheet for update)  Subjective functional status/changes:   [] No changes reported  \"I am doing fine other than being cold. I slept better last night. I still have pain but its not as bad as it has been, so that's getting better. \"    OBJECTIVE    Modality rationale: decrease edema, decrease inflammation and decrease pain to improve the patients ability to reduce soreness after PT interventions   Min Type Additional Details   10 [x] Estim:  [x]Unatt       [x]IFC  []Premod                        []Other:  []w/ice   [x]w/heat  Position: seated  Location: right shoulder    [] Estim: []Att    []TENS instruct  []NMES                    []Other:  []w/US   []w/ice   []w/heat  Position:  Location:    []  Traction: [] Cervical       []Lumbar                       [] Prone          []Supine                       []Intermittent   []Continuous Lbs:  [] before manual  [] after manual    []  Ultrasound: []Continuous   [] Pulsed                           []1MHz   []3MHz W/cm2:  Location:    []  Iontophoresis with dexamethasone         Location: [] Take home patch   [] In clinic    []  Ice     []  heat  []  Ice massage  []  Laser   []  Anodyne Position:   Location:    []  Laser with stim  []  Other:  Position:  Location:    []  Vasopneumatic Device Pressure:       [] lo [] med [] hi   Temperature: [] lo [] med [] hi   [x] Skin assessment post-treatment:  [x]intact []redness- no adverse reaction    []redness  adverse reaction:     20 min Therapeutic Exercise:  [x] See flow sheet :   Rationale: increase ROM and increase strength to improve the patients ability to improve right shoulder function and ease of ADLs     25 min Manual Therapy: Seated TrP releases to levator scapulae, infraspinatus, teres minor, pecotral mm; S/L STJ mobs, Gr III; Supine Gr II Inf GHJ glides; gentle manual stretching into Flex/Scaption, ER in scapular plane, void of pain   Rationale: decrease pain, increase ROM, increase tissue extensibility and decrease trigger points to decrease pain, improve positional tolerance, and increase right shoulder, scapular mobility          With   [] TE   [] TA   [] neuro   [] other: Patient Education: [x] Review HEP    [] Progressed/Changed HEP based on:   [] positioning   [] body mechanics   [] transfers   [] heat/ice application    [] other:      Other Objective/Functional Measures: Continued to focus on manual interventions to reduce soft tissue restrictions and allow for less painful movement of right shoulder. Performed exercises per flow sheet. Applied MHP instead of cold pack today to determine improvement in pain levels and muscle tightness. Will reassess next visit. Pain Level (0-10 scale) post treatment: 0/10    ASSESSMENT/Changes in Function:  Pt continues to report pain into right deltoid with movement of right shoulder but was able to demonstrate increases in ROM during pulleys and table slides with less discomfort. Pt able to tolerate more movement into ER in scapular plane today during manual interventions of about 25 deg.     Patient will continue to benefit from skilled PT services to modify and progress therapeutic interventions, address functional mobility deficits, address ROM deficits, address strength deficits, analyze and address soft tissue restrictions, analyze and cue movement patterns, analyze and modify body mechanics/ergonomics, assess and modify postural abnormalities and instruct in home and community integration to attain remaining goals. []  See Plan of Care  []  See progress note/recertification  []  See Discharge Summary         Progress towards goals / Updated goals:  Short Term Goals: To be accomplished in 1 weeks:  Goal: Pt to be compliant with initial HEP to improve right shoulder mobility for ease of positioning and ADL performance. Status at last note/certification: Established and reviewed with Pt  Current status: met - Pt reports compliance   Long Term Goals: To be accomplished in 5 weeks:  Goal: Pt to increase right shoulder AROM by at least 20 deg all planes to increase ease of bathing and dressing independently. Status at last note/certification: Flex 71 deg, ABD 60 deg, Ext 32 deg, ER at neutral - 5 deg  Current status: progressing - Flex 76 deg, ABD 70 deg, ER at 45 deg - 10 deg  Goal: Pt to increase right shoulder strength to 3+/5 to increase ease of lifting light grocery bags. Status at last note/certification: 2/5 grossly due to limited ROM  Current status: reassess at MD note  Goal: Pt to report < 5/10 pain at worst to be able to sleep comfortably at night in bed for better rest.  Status at last note/certification: 10/10 pain at worst; sleeps on couch or bed throughout each night  Current status: progressing - 7-8/10 pain at worst; able to sleep supine more with less pain  Goal: Pt to report FOTO score of 58 pts to show improved function and quality of life.   Status at last note/certification: QBNT 11 PXJ   Current status: reassess at MD note    PLAN  [x]  Upgrade activities as tolerated     []  Continue plan of care  []  Update interventions per flow sheet       []  Discharge due to:_  []  Other:_      Dianna Anderson, PT 5/20/2020  12:16 PM    Future Appointments   Date Time Provider Des Hernandez   5/26/2020 12:00 PM Dianna Anderson, PT [facility]   5/28/2020 11:15 [facility]

## 2020-05-26 ENCOUNTER — APPOINTMENT (OUTPATIENT)
Dept: PHYSICAL THERAPY | Age: 80
End: 2020-05-26
Payer: MEDICARE

## 2020-05-27 ENCOUNTER — HOSPITAL ENCOUNTER (OUTPATIENT)
Dept: PHYSICAL THERAPY | Age: 80
Discharge: HOME | End: 2020-05-27
Payer: MEDICARE

## 2020-05-27 PROCEDURE — 97530 THERAPEUTIC ACTIVITIES: CPT

## 2020-05-27 PROCEDURE — 97140 MANUAL THERAPY 1/> REGIONS: CPT

## 2020-05-27 PROCEDURE — 97014 ELECTRIC STIMULATION THERAPY: CPT

## 2020-05-27 PROCEDURE — 97110 THERAPEUTIC EXERCISES: CPT

## 2020-05-27 NOTE — PROGRESS NOTES
PT DAILY TREATMENT NOTE 10-18    Patient Name: Nicole Del Rosario  Date:2020  : 1940  [x]  Patient  Verified  Payor: [place] MEDICARE / Plan: [place] MEDICARE PART A & B / Product Type: Medicare /    In time:12:00  Out time:1:10  Total Treatment Time (min): 70  Visit #: 8 of 10    Medicare/BCBS Only   Total Timed Codes (min):  60 1:1 Treatment Time:  60       Treatment Area: Right shoulder pain [M25.511]    SUBJECTIVE  Pain Level (0-10 scale): 7/10  Any medication changes, allergies to medications, adverse drug reactions, diagnosis change, or new procedure performed?: [x] No    [] Yes (see summary sheet for update)  Subjective functional status/changes:   [] No changes reported  \"I had less pain after the hot pack last time. I still have the pain going down the arm and I can't get comfortable at night. I switch from the recliner to the bed, but I don't get more than 4-5 hrs of sleep at night. \"    OBJECTIVE    Modality rationale: decrease edema, decrease inflammation and decrease pain to improve the patients ability to reduce soreness after PT interventions   Min Type Additional Details   10 [x] Estim:  [x]Unatt       [x]IFC  []Premod                        []Other:  []w/ice   [x]w/heat  Position: seated  Location: right shoulder    [] Estim: []Att    []TENS instruct  []NMES                    []Other:  []w/US   []w/ice   []w/heat  Position:  Location:    []  Traction: [] Cervical       []Lumbar                       [] Prone          []Supine                       []Intermittent   []Continuous Lbs:  [] before manual  [] after manual    []  Ultrasound: []Continuous   [] Pulsed                           []1MHz   []3MHz W/cm2:  Location:    []  Iontophoresis with dexamethasone         Location: [] Take home patch   [] In clinic    []  Ice     []  heat  []  Ice massage  []  Laser   []  Anodyne Position:   Location:    []  Laser with stim  []  Other:  Position:  Location:    []  Vasopneumatic Device Pressure:       [] lo [] med [] hi   Temperature: [] lo [] med [] hi   [x] Skin assessment post-treatment:  [x]intact []redness- no adverse reaction    []redness  adverse reaction:     25 min Therapeutic Exercise:  [x] See flow sheet :   Rationale: increase ROM and increase strength to improve the patients ability to improve right shoulder function and ease of ADLs     20 min Therapeutic Activity:  []  See flow sheet : goals, FOTO   Rationale: increase ROM and increase strength  to improve the patients ability to increase right shoulder ROM and improve ADL function    15 min Manual Therapy: Seated TrP releases to levator scapulae, infraspinatus, teres minor, pecotral mm; S/L STJ mobs, Gr III; Supine Gr II Inf GHJ glides; gentle manual stretching into Flex/Scaption, ER in scapular plane, void of pain   Rationale: decrease pain, increase ROM, increase tissue extensibility and decrease trigger points to decrease pain, improve positional tolerance, and increase right shoulder, scapular mobility          With   [] TE   [] TA   [] neuro   [] other: Patient Education: [x] Review HEP    [] Progressed/Changed HEP based on:   [] positioning   [] body mechanics   [] transfers   [] heat/ice application    [] other:      Other Objective/Functional Measures: Reassessed goals for re-certification note. Focused on manual interventions and gentle ROM within Pt's tolerance due to pain levels. Pt continues to have pain with elevation of right shoulder but has made slow but steady gains in ROM measurements. Most limited in ER with pulling pain along front of shoulder. Increased pain noted along right deltoid region.     Pain Level (0-10 scale) post treatment: 0.5/10    ASSESSMENT/Changes in Function:    Functional Gains: Pt is able to raise arm higher; is bathing independently; is able to dress self and don shirts overhead with some difficulty; is able to iron own clothes  Functional Deficits: Pt still has pain in right deltoid region; still has difficulty reaching overhead; still not sleeping more than 4-5 hrs per night due to inability to get in comfortable position for shoulder  % improvement: 50%  Pain   Average: 7/10       Best: 7/10     Worst: 8/10  Patient Goal: \"Movement, less pain, recovery. \"    Patient will continue to benefit from skilled PT services to modify and progress therapeutic interventions, address functional mobility deficits, address ROM deficits, address strength deficits, analyze and address soft tissue restrictions, analyze and cue movement patterns, analyze and modify body mechanics/ergonomics, assess and modify postural abnormalities and instruct in home and community integration to attain remaining goals. []  See Plan of Care  [x]  See progress note/recertification  []  See Discharge Summary         Progress towards goals / Updated goals:  Short Term Goals: To be accomplished in 1 weeks:  Goal: Pt to be compliant with initial HEP to improve right shoulder mobility for ease of positioning and ADL performance. Status at last note/certification: Established and reviewed with Pt  Current status: met - Pt reports compliance   Long Term Goals: To be accomplished in 5 weeks:  Goal: Pt to increase right shoulder AROM by at least 20 deg all planes to increase ease of bathing and dressing independently. Status at last note/certification: Flex 71 deg, ABD 60 deg, Ext 32 deg, ER at neutral - 5 deg  Current status: progressing - Flex 80 deg, ABD 75 deg, ER at 45 deg - 19 deg  Goal: Pt to increase right shoulder strength to 3+/5 to increase ease of lifting light grocery bags.   Status at last note/certification: 2/5 grossly due to limited ROM  Current status: progressing - 3/5 flex/scaption; 2/5 ER  Goal: Pt to report < 5/10 pain at worst to be able to sleep comfortably at night in bed for better rest.  Status at last note/certification: 10/10 pain at worst; sleeps on couch or bed throughout each night  Current status: progressing - 8/10 pain at worst; still has trouble sleeping due to pain   Goal: Pt to report FOTO score of 58 pts to show improved function and quality of life.   Status at last note/certification: RYUO 61 NQP   Current status: progressing - FOTO 46 pts    PLAN  [x]  Upgrade activities as tolerated     []  Continue plan of care  []  Update interventions per flow sheet       []  Discharge due to:_  []  Other:_      Darnell Anderson, PONCE 5/27/2020  12:16 PM    Future Appointments   Date Time Provider Des Hernandez   5/27/2020 12:00 PM Darnell Anderson, PT [facility]   5/29/2020 11:15 AM Darnell Anderson, PT [facility]

## 2020-05-27 NOTE — PROGRESS NOTES
[facility]  [address]  [place], [address]  [phone] fax    Continued Plan of Care/ Re-certification for Physical Therapy Services    Patient name: Teresia Opitz Start of Care: 2020   Referral source: Chapo Adan MD : 1940               Medical Diagnosis: Right shoulder pain [M25.511]  Payor: VA MEDICARE / Plan: VA MEDICARE PART A & B / Product Type: Medicare /  Onset Date:3/12/20 (surgery)               Treatment Diagnosis: Right Shoulder Pain   Prior Hospitalization: see medical history Provider#: [id number]   Medications: Verified on Patient summary List    Comorbidities: Arthritis; DM; Osteoporosis; Thyroid problems; hx left shoulder surgeries x 2; hx right shoulder surgeries x 3   Prior Level of Function: Lives in [facility] with son's family; amb without A.D; modified independent with ADLs    Visits from Start of Care: 8    Missed Visits: 0    The Plan of Care and following information is based on the patient's current status:  Short Term Goals: To be accomplished in 1 weeks:  Goal: Pt to be compliant with initial HEP to improve right shoulder mobility for ease of positioning and ADL performance. Status at last note/certification: Established and reviewed with Pt  Current status: met - Pt reports compliance   Long Term Goals: To be accomplished in 5 weeks:  Goal: Pt to increase right shoulder AROM by at least 20 deg all planes to increase ease of bathing and dressing independently. Status at last note/certification: Flex 71 deg, ABD 60 deg, Ext 32 deg, ER at neutral - 5 deg  Current status: progressing - Flex 80 deg, ABD 75 deg, ER at 45 deg - 19 deg  Goal: Pt to increase right shoulder strength to 3+/5 to increase ease of lifting light grocery bags.   Status at last note/certification: 2/5 grossly due to limited ROM  Current status: progressing - 3/5 flex/scaption; 2/5 ER  Goal: Pt to report < 5/10 pain at worst to be able to sleep comfortably at night in bed for better rest.  Status at last note/certification: 10/10 pain at worst; sleeps on couch or bed throughout each night  Current status: progressing - 8/10 pain at worst; still has trouble sleeping due to pain   Goal: Pt to report FOTO score of 58 pts to show improved function and quality of life. Status at last note/certification: SPNN 80 CJK   Current status: progressing - FOTO 46 pts    Key functional changes: Functional Gains: Pt is able to raise arm higher; is bathing independently; is able to dress self and don shirts overhead with some difficulty; is able to iron own clothes  Functional Deficits: Pt still has pain in right deltoid region that radiates into elbow as well as pain along deltoid/pectoral crease (incision); still has difficulty reaching overhead; still not sleeping more than 4-5 hrs per night due to inability to get in comfortable position for shoulder - alternates between recliner and bed  % improvement: 50%  Pain   Average: 7/10                  Best: 7/10                Worst: 8/10  Patient Goal: \"Movement, less pain, recovery. \"      Problems/ barriers to goal attainment: None other than increased pain levels     Problem List: pain affecting function, decrease ROM, decrease strength, edema affecting function, decrease ADL/ functional abilitiies, decrease activity tolerance and decrease flexibility/ joint mobility    Treatment Plan: Therapeutic exercise, Therapeutic activities, Neuromuscular re-education, Physical agent/modality, Manual therapy, Patient education, Self Care training and Functional mobility training     Patient Goal (s) has been updated and includes: \"Movement, pain relief, recovery. \"     Goals for this certification period to be accomplished in 5 weeks:  Goal: Pt to increase right shoulder AROM by at least 20 deg all planes to increase ease of bathing and dressing independently.   Status at last note/certification: Flex 80 deg, ABD 75 deg, ER at 45 deg - 19 deg  Current status:   Goal: Pt to increase right shoulder strength to 3+/5 to increase ease of lifting light grocery bags. Status at last note/certification: 3/5 flex/scaption; 2/5 ER  Current status:   Goal: Pt to report < 5/10 pain at worst to be able to sleep comfortably at night in bed for better rest.  Status at last note/certification: 8/10 pain at worst; still has trouble sleeping due to pain   Current status:   Goal: Pt to report FOTO score of 58 pts to show improved function and quality of life. Status at last note/certification: WEXQ 17 JLX   Current status:     Frequency / Duration: Patient to be seen 2 times per week for 5 weeks:    Assessment / Recommendations:Pt would benefit from continued skilled therapy to improve right shoulder AROM, reduce overall pain levels to improve sleep at night, ADL functional independence in home. Certification Period: 5/28/20 - 6/26/20    Abimbola Anderson, PT 5/27/2020 12:00 PM    ________________________________________________________________________  I certify that the above Therapy Services are being furnished while the patient is under my care. I agree with the treatment plan and certify that this therapy is necessary. [] I have read the above and request that my patient continue as recommended.   [] I have read the above report and request that my patient continue therapy with the following changes/special instructions: _____________________________________________  [] I have read the above report and request that my patient be discharged from therapy    Physician's Signature:____________Date:_________TIME:________    ** Signature, Date and Time must be completed for valid certification **    Please sign and return to [facility] [address] [place], [place] [address]  [place], [address]  [phone] fax

## 2020-05-28 ENCOUNTER — APPOINTMENT (OUTPATIENT)
Dept: PHYSICAL THERAPY | Age: 80
End: 2020-05-28
Payer: MEDICARE

## 2020-05-29 ENCOUNTER — HOSPITAL ENCOUNTER (OUTPATIENT)
Dept: PHYSICAL THERAPY | Age: 80
Discharge: HOME | End: 2020-05-29
Payer: MEDICARE

## 2020-05-29 PROCEDURE — 97530 THERAPEUTIC ACTIVITIES: CPT

## 2020-05-29 PROCEDURE — 97014 ELECTRIC STIMULATION THERAPY: CPT

## 2020-05-29 PROCEDURE — 97110 THERAPEUTIC EXERCISES: CPT

## 2020-05-29 PROCEDURE — 97140 MANUAL THERAPY 1/> REGIONS: CPT

## 2020-05-29 NOTE — PROGRESS NOTES
PT DAILY TREATMENT NOTE 10-18    Patient Name: Franki Delaney  Date:2020  : 1940  [x]  Patient  Verified  Payor: VA MEDICARE / Plan: VA MEDICARE PART A & B / Product Type: Medicare /    In time:11:15  Out time:12:19  Total Treatment Time (min): 64  Visit #: 1 of 10    Medicare/BCBS Only   Total Timed Codes (min):  54 1:1 Treatment Time:  54       Treatment Area: Right shoulder pain [M25.511]    SUBJECTIVE  Pain Level (0-10 scale): 7-8/10  Any medication changes, allergies to medications, adverse drug reactions, diagnosis change, or new procedure performed?: [x] No    [] Yes (see summary sheet for update)  Subjective functional status/changes:   [] No changes reported  \"The shoulder hurts today. It started at 6 am this morning. It woke me up. It hurt so bad I had to get up and take meds and stretch it out. I'm not sure if I was laying on it or not. \"    OBJECTIVE    Modality rationale: decrease pain and increase tissue extensibility to improve the patients ability to reduce pain, increase mobility   Min Type Additional Details   10 [x] Estim:  [x]Unatt       [x]IFC  []Premod                        []Other:  []w/ice   [x]w/heat  Position: seated  Location: right shoulder    [] Estim: []Att    []TENS instruct  []NMES                    []Other:  []w/US   []w/ice   []w/heat  Position:  Location:    []  Traction: [] Cervical       []Lumbar                       [] Prone          []Supine                       []Intermittent   []Continuous Lbs:  [] before manual  [] after manual    []  Ultrasound: []Continuous   [] Pulsed                           []1MHz   []3MHz W/cm2:  Location:    []  Iontophoresis with dexamethasone         Location: [] Take home patch   [] In clinic    []  Ice     []  heat  []  Ice massage  []  Laser   []  Anodyne Position:  Location:    []  Laser with stim  []  Other:  Position:  Location:    []  Vasopneumatic Device Pressure:       [] lo [] med [] hi   Temperature: [] lo [] med [] hi   [x] Skin assessment post-treatment:  [x]intact [x]redness- no adverse reaction    []redness  adverse reaction:     14 min Therapeutic Exercise:  [] See flow sheet :   Rationale: increase ROM and increase strength to improve the patients ability to increase right shoulder ROM for ease of ADLs    15 min Therapeutic Activity:  []  See flow sheet : updated HEP for stretches to assist with right shoulder pain   Rationale: increase ROM  to improve the patients ability to improve right shoulder mobility without increased sharp pain during movements     25 min Manual Therapy:  S/L gentle STJ oscillations, mobs Gr II, STM to right rhomboids; Supine Gr I oscillations, Gr II Inf/Post GHJ glides; dynamic subscapularis release with passive elevation; manual stretching into flexion, scaption, ER   Rationale: increase ROM, increase tissue extensibility and decrease trigger points to reduce sharp pains, improve shoulder and scapular mobility, increase right shoulder AROM          With   [] TE   [x] TA   [] neuro   [] other: Patient Education: [x] Review HEP    [] Progressed/Changed HEP based on:   [] positioning   [] body mechanics   [] transfers   [] heat/ice application    [] other:      Other Objective/Functional Measures: Updated HEP for shoulder stretches to assist with right shoulder mobility with reduced episodes of sharp pains. Pt with increased pain during most exercise interventions today, requiring short rest breaks. Pain Level (0-10 scale) post treatment: 6/10    ASSESSMENT/Changes in Function: Pt reported multiple episodes of sharp pains during exercise interventions today, noting pain along deltoid-pectoral crease and along deltoid into elbow. Pt return demonstrated 100% understanding of stretches to assist with reducing this pain at home.   Pt able to gain improvements in shoulder and scapular mobility with manual interventions and 10 deg increase noted in right shoulder flex AROM during measurements afterwards. Patient will continue to benefit from skilled PT services to address functional mobility deficits, address ROM deficits, address strength deficits, analyze and address soft tissue restrictions, analyze and cue movement patterns, analyze and modify body mechanics/ergonomics, assess and modify postural abnormalities and instruct in home and community integration to attain remaining goals. []  See Plan of Care  []  See progress note/recertification  []  See Discharge Summary         Progress towards goals / Updated goals:  Goal: Pt to increase right shoulder AROM by at least 20 deg all planes to increase ease of bathing and dressing independently. Status at last note/certification: Flex 80 deg, ABD 75 deg, ER at 45 deg - 19 deg  Current status: progressing - Flex 90 deg, ABD 75 deg  Goal: Pt to increase right shoulder strength to 3+/5 to increase ease of lifting light grocery bags. Status at last note/certification: 3/5 flex/scaption; 2/5 ER  Current status: reassess at MD note  Goal: Pt to report < 5/10 pain at worst to be able to sleep comfortably at night in bed for better rest.  Status at last note/certification: 8/10 pain at worst; still has trouble sleeping due to pain   Current status: not met - 8/10 pain at worst this morning   Goal: Pt to report FOTO score of 58 pts to show improved function and quality of life.   Status at last note/certification: VCXL 77 WXZ   Current status: reassess at MD note    PLAN  []  Upgrade activities as tolerated     [x]  Continue plan of care  []  Update interventions per flow sheet       []  Discharge due to:_  []  Other:_      Maribel Anderson PT 5/29/2020  11:13 AM    Future Appointments   Date Time Provider Des Hernandez   5/29/2020 11:15 AM Maribel Anderson, PT [facility]   6/2/2020 11:15 AM Maribel Anderson, PT [facility]   6/5/2020 11:15 AM Maribel Anderson, PT [facility]   6/8/2020 11:15 AM Maribel Anderson, PT [facility]   6/11/2020 11:15 AM Keyonna Anderson, PT [facility]   6/15/2020 11:15 AM Keyonna Anderson, PT [facility]   6/18/2020 11:15 AM Keyonna Anderson, PT [facility]   6/22/2020 11:15 AM Keyonna Anderson, PT [facility]   6/25/2020 11:15 AM Keyonna Anderson, PT [facility]   6/29/2020 11:15 AM Keyonna Anderson, PT [facility]

## 2020-06-02 ENCOUNTER — APPOINTMENT (OUTPATIENT)
Dept: PHYSICAL THERAPY | Age: 80
End: 2020-06-02

## 2020-06-05 ENCOUNTER — APPOINTMENT (OUTPATIENT)
Dept: PHYSICAL THERAPY | Age: 80
End: 2020-06-05

## 2020-06-08 ENCOUNTER — APPOINTMENT (OUTPATIENT)
Dept: PHYSICAL THERAPY | Age: 80
End: 2020-06-08

## 2020-06-11 ENCOUNTER — APPOINTMENT (OUTPATIENT)
Dept: PHYSICAL THERAPY | Age: 80
End: 2020-06-11

## 2020-06-15 ENCOUNTER — APPOINTMENT (OUTPATIENT)
Dept: PHYSICAL THERAPY | Age: 80
End: 2020-06-15

## 2020-06-18 ENCOUNTER — APPOINTMENT (OUTPATIENT)
Dept: PHYSICAL THERAPY | Age: 80
End: 2020-06-18

## 2020-06-22 ENCOUNTER — APPOINTMENT (OUTPATIENT)
Dept: PHYSICAL THERAPY | Age: 80
End: 2020-06-22

## 2020-06-22 NOTE — PROGRESS NOTES
[facility]  [address]  [place], [address]  [phone] fax    Physical Therapy Discharge Summary  Patient name: [facility] DANIEL of Care: 2020   Referral source: Mike Bright MD : 1940               Medical Diagnosis: Right shoulder pain [M25.511]  Payor: VA MEDICARE / Plan: VA MEDICARE PART A & B / Product Type: Medicare /  Onset Date:3/12/20 (surgery)               Treatment Diagnosis: Right Shoulder Pain   Prior Hospitalization: see medical history Provider#: [id number]   Medications: Verified on Patient summary List    Comorbidities: Arthritis; DM; Osteoporosis; Thyroid problems; hx left shoulder surgeries x 2; hx right shoulder surgeries x 3   Prior Level of Function: Lives in 1-story home with son's family; amb without A.D; modified independent with ADLs  Visits from Start of Care: 9    Missed Visits: 0  Reporting Period : 20 to 20      Summary of Care:  Goal: Pt to increase right shoulder AROM by at least 20 deg all planes to increase ease of bathing and dressing independently. Status at last note/certification: Flex 80 deg, ABD 75 deg, ER at 45 deg - 19 deg  Current status: progressing - Flex 90 deg, ABD 75 deg  Goal: Pt to increase right shoulder strength to 3+/5 to increase ease of lifting light grocery bags. Status at last note/certification: 3/5 flex/scaption; 2/5 ER  Current status: reassess at MD note  Goal: Pt to report < 5/10 pain at worst to be able to sleep comfortably at night in bed for better rest.  Status at last note/certification: 8/10 pain at worst; still has trouble sleeping due to pain   Current status: not met - 8/10 pain at worst this morning   Goal: Pt to report FOTO score of 58 pts to show improved function and quality of life.   Status at last note/certification: ZKTB 86 IDQ   Current status: not met - unable to reassess    ASSESSMENT/RECOMMENDATIONS: Pt attended initial evaluation and skilled therapy consistently for 9 treatment sessions and made progress. At time of last visit, Pt reported 7-8/10 pain due to pain during the night from turning onto right shoulder. Pt exhibited gains in right shoulder AROM but continued to have increased pain in right deltoid, along deltoid/pectoral crease, and into biceps region that affected ability to fully move right UE. Pt called after 5/29/20, appt and requested to be discharged as [place] was [place], and would do therapy there until return. Thus, Pt will be discharged at this time.   Thank you for this referral.     [x]Discontinue therapy: []Patient has reached or is progressing toward set goals      [x]Patient is non-compliant or has abdicated      []Due to lack of appreciable progress towards set goals    Darnell Anderson, PT 6/22/2020 3:04 PM

## 2020-06-25 ENCOUNTER — APPOINTMENT (OUTPATIENT)
Dept: PHYSICAL THERAPY | Age: 80
End: 2020-06-25

## 2020-06-29 ENCOUNTER — APPOINTMENT (OUTPATIENT)
Dept: PHYSICAL THERAPY | Age: 80
End: 2020-06-29